# Patient Record
Sex: MALE | Race: WHITE | NOT HISPANIC OR LATINO | URBAN - METROPOLITAN AREA
[De-identification: names, ages, dates, MRNs, and addresses within clinical notes are randomized per-mention and may not be internally consistent; named-entity substitution may affect disease eponyms.]

---

## 2023-03-07 DIAGNOSIS — F41.8 ANXIETY ASSOCIATED WITH DEPRESSION: ICD-10-CM

## 2023-03-07 RX ORDER — ESCITALOPRAM OXALATE 20 MG/1
10 TABLET ORAL DAILY
COMMUNITY
End: 2023-03-07 | Stop reason: SDUPTHER

## 2023-03-07 RX ORDER — ESCITALOPRAM OXALATE 20 MG/1
20 TABLET ORAL DAILY
Qty: 30 TABLET | Refills: 0 | Status: SHIPPED | OUTPATIENT
Start: 2023-03-07 | End: 2023-04-05

## 2023-03-31 DIAGNOSIS — F41.8 ANXIETY ASSOCIATED WITH DEPRESSION: ICD-10-CM

## 2023-04-05 RX ORDER — ESCITALOPRAM OXALATE 20 MG/1
TABLET ORAL
Qty: 30 TABLET | Refills: 0 | Status: SHIPPED | OUTPATIENT
Start: 2023-04-05 | End: 2023-05-03

## 2023-05-02 DIAGNOSIS — F41.8 ANXIETY ASSOCIATED WITH DEPRESSION: ICD-10-CM

## 2023-05-03 RX ORDER — ESCITALOPRAM OXALATE 20 MG/1
TABLET ORAL
Qty: 30 TABLET | Refills: 0 | Status: SHIPPED | OUTPATIENT
Start: 2023-05-03 | End: 2023-06-02

## 2023-06-01 DIAGNOSIS — F41.8 ANXIETY ASSOCIATED WITH DEPRESSION: ICD-10-CM

## 2023-06-02 RX ORDER — ESCITALOPRAM OXALATE 20 MG/1
TABLET ORAL
Qty: 30 TABLET | Refills: 0 | Status: SHIPPED | OUTPATIENT
Start: 2023-06-02 | End: 2023-07-11 | Stop reason: SDUPTHER

## 2023-06-27 DIAGNOSIS — F41.8 ANXIETY ASSOCIATED WITH DEPRESSION: ICD-10-CM

## 2023-06-29 RX ORDER — ESCITALOPRAM OXALATE 20 MG/1
TABLET ORAL
Qty: 30 TABLET | Refills: 0 | OUTPATIENT
Start: 2023-06-29

## 2023-07-11 RX ORDER — ESCITALOPRAM OXALATE 20 MG/1
20 TABLET ORAL DAILY
Qty: 30 TABLET | Refills: 0 | Status: SHIPPED | OUTPATIENT
Start: 2023-07-11 | End: 2023-08-01 | Stop reason: SDUPTHER

## 2023-08-01 ENCOUNTER — OFFICE VISIT (OUTPATIENT)
Dept: PRIMARY CARE | Facility: CLINIC | Age: 43
End: 2023-08-01
Payer: COMMERCIAL

## 2023-08-01 VITALS
HEIGHT: 74 IN | BODY MASS INDEX: 25.95 KG/M2 | WEIGHT: 202.2 LBS | DIASTOLIC BLOOD PRESSURE: 70 MMHG | HEART RATE: 60 BPM | SYSTOLIC BLOOD PRESSURE: 120 MMHG

## 2023-08-01 DIAGNOSIS — Z00.00 HEALTHCARE MAINTENANCE: Primary | ICD-10-CM

## 2023-08-01 DIAGNOSIS — E78.2 MIXED HYPERLIPIDEMIA: ICD-10-CM

## 2023-08-01 DIAGNOSIS — F32.A ANXIETY AND DEPRESSION: ICD-10-CM

## 2023-08-01 DIAGNOSIS — F41.9 ANXIETY AND DEPRESSION: ICD-10-CM

## 2023-08-01 DIAGNOSIS — F41.8 ANXIETY ASSOCIATED WITH DEPRESSION: ICD-10-CM

## 2023-08-01 PROBLEM — E78.5 HLD (HYPERLIPIDEMIA): Status: ACTIVE | Noted: 2023-08-01

## 2023-08-01 LAB
POC APPEARANCE, URINE: CLEAR
POC BILIRUBIN, URINE: NEGATIVE
POC BLOOD, URINE: NEGATIVE
POC COLOR, URINE: YELLOW
POC GLUCOSE, URINE: NEGATIVE MG/DL
POC KETONES, URINE: NEGATIVE MG/DL
POC LEUKOCYTES, URINE: NEGATIVE
POC NITRITE,URINE: NEGATIVE
POC PH, URINE: 6.5 PH
POC PROTEIN, URINE: NEGATIVE MG/DL
POC SPECIFIC GRAVITY, URINE: 1.01
POC UROBILINOGEN, URINE: 0.2 EU/DL

## 2023-08-01 PROCEDURE — 81002 URINALYSIS NONAUTO W/O SCOPE: CPT | Performed by: STUDENT IN AN ORGANIZED HEALTH CARE EDUCATION/TRAINING PROGRAM

## 2023-08-01 PROCEDURE — 1036F TOBACCO NON-USER: CPT | Performed by: STUDENT IN AN ORGANIZED HEALTH CARE EDUCATION/TRAINING PROGRAM

## 2023-08-01 PROCEDURE — 99396 PREV VISIT EST AGE 40-64: CPT | Performed by: STUDENT IN AN ORGANIZED HEALTH CARE EDUCATION/TRAINING PROGRAM

## 2023-08-01 PROCEDURE — 99213 OFFICE O/P EST LOW 20 MIN: CPT | Performed by: STUDENT IN AN ORGANIZED HEALTH CARE EDUCATION/TRAINING PROGRAM

## 2023-08-01 RX ORDER — ESCITALOPRAM OXALATE 20 MG/1
20 TABLET ORAL DAILY
Qty: 90 TABLET | Refills: 1 | Status: SHIPPED | OUTPATIENT
Start: 2023-08-01 | End: 2024-02-08

## 2023-08-01 NOTE — PROGRESS NOTES
"Subjective   Patient ID: Bebeto Garcia is a 43 y.o. male who presents for Annual Exam.  Today he is accompanied by alone.     HPI    1. Health maintenance  Overall he feels well without any major issues/complaints  Immunizations: UTD including Tdap (8/1/2022) COVID-19 x3  Colon cancer screening: No Fhx colon cancer or any other types of cancer.   Diet: Balanced during school year, minor lapses in diet during summertime.   Exercise: Active at his job as a  and \"odd jobs around the house\"  Tobacco: None but does use recreational marijuana   EtOH: Socially (2 drinks per week).   Denies any chest pain, palpitations, shortness of breath, cough, nausea, vomiting, diarrhea, or any other acute signs/symptoms     2. Anxiety/Depression  Well controlled, taking Lexapro 20 mg daily just now over 1 year ago.   Requesting refills     3. Hyperlipidemia   Last lipid panel (8/1/2022) with cholesterol 241, , HDL 59.8, and TG 83.   Pt not taking any statin or other cholesterol medication.   Fhx of Types II diabetes with his father and grandfather.  This contributing to both having multiple heart attacks and father having a CABG    Current Outpatient Medications on File Prior to Visit   Medication Sig Dispense Refill    [DISCONTINUED] escitalopram (Lexapro) 20 mg tablet Take 1 tablet (20 mg) by mouth once daily. 30 tablet 0     No current facility-administered medications on file prior to visit.        No Known Allergies    Immunization History   Administered Date(s) Administered    MMR vaccine, subcutaneous (MMR II) 07/01/1993    Moderna SARS-CoV-2 Vaccination 02/24/2021, 03/22/2021    Pfizer Purple Cap SARS-CoV-2 11/22/2021    Td (adult), unspecified 11/14/1996    Tdap vaccine, age 10 years and older (BOOSTRIX) 08/01/2022         Review of Systems  All pertinent positive symptoms are included in the history of present illness.  All other systems have been reviewed and are negative and noncontributory " "to this patient's current ailments.     Objective   /70 (BP Location: Left arm, Patient Position: Sitting, BP Cuff Size: Adult)   Pulse 60   Ht 1.88 m (6' 2\")   Wt 91.7 kg (202 lb 3.2 oz)   BMI 25.96 kg/m²   BSA: 2.19 meters squared  No visits with results within 1 Month(s) from this visit.   Latest known visit with results is:   [facility] Encounter on 08/01/2022   Component Date Value Ref Range Status    Hepatitis C Ab 08/01/2022 NONREACTIVE  NONREACTIVE Final    Comment:  Results from patients taking biotin supplements or receiving   high-dose biotin therapy should be interpreted with caution   due to possible interference with this test. Providers may    contact their local laboratory for further information.      TSH 08/01/2022 2.08  0.44 - 3.98 mIU/L Final    Comment:  TSH testing is performed using different testing    methodology at [facility] than at other    [facility]. Direct result comparisons should    only be made within the same method.      WBC 08/01/2022 3.7 (L)  4.4 - 11.3 x10E9/L Final    RBC 08/01/2022 5.02  4.50 - 5.90 x10E12/L Final    Hemoglobin 08/01/2022 16.0  13.5 - 17.5 g/dL Final    Hematocrit 08/01/2022 45.4  41.0 - 52.0 % Final    MCV 08/01/2022 90  80 - 100 fL Final    MCHC 08/01/2022 35.2  32.0 - 36.0 g/dL Final    Platelets 08/01/2022 162  150 - 450 x10E9/L Final    RDW 08/01/2022 12.2  11.5 - 14.5 % Final    Neutrophils % 08/01/2022 53.7  40.0 - 80.0 % Final    Immature Granulocytes %, Automated 08/01/2022 0.3  0.0 - 0.9 % Final    Comment:  Immature Granulocyte Count (IG) includes promyelocytes,    myelocytes and metamyelocytes but does not include bands.   Percent differential counts (%) should be interpreted in the   context of the absolute cell counts (cells/L).      Lymphocytes % 08/01/2022 30.7  13.0 - 44.0 % Final    Monocytes % 08/01/2022 10.7  2.0 - 10.0 % Final    Eosinophils % 08/01/2022 3.8  0.0 - 6.0 % Final    Basophils % 08/01/2022 0.8  0.0 - " 2.0 % Final    Neutrophils Absolute 08/01/2022 1.96  1.20 - 7.70 x10E9/L Final    Lymphocytes Absolute 08/01/2022 1.12 (L)  1.20 - 4.80 x10E9/L Final    Monocytes Absolute 08/01/2022 0.39  0.10 - 1.00 x10E9/L Final    Eosinophils Absolute 08/01/2022 0.14  0.00 - 0.70 x10E9/L Final    Basophils Absolute 08/01/2022 0.03  0.00 - 0.10 x10E9/L Final    Cholesterol 08/01/2022 241 (H)  0 - 199 mg/dL Final    Comment: .      AGE      DESIRABLE   BORDERLINE HIGH   HIGH     0-19 Y     0 - 169       170 - 199     >/= 200    20-24 Y     0 - 189       190 - 224     >/= 225         >24 Y     0 - 199       200 - 239     >/= 240   **All ranges are based on fasting samples. Specific   therapeutic targets will vary based on patient-specific   cardiac risk.  .   Pediatric guidelines reference:Pediatrics 2011, 128(S5).   Adult guidelines reference: NCEP ATPIII Guidelines,     CASSIE 2001, 258:2486-97  .   Venipuncture immediately after or during the    administration of Metamizole may lead to falsely   low results. Testing should be performed immediately   prior to Metamizole dosing.      HDL 08/01/2022 59.8  mg/dL Final    Comment: .      AGE      VERY LOW   LOW     NORMAL    HIGH       0-19 Y       < 35   < 40     40-45     ----    20-24 Y       ----   < 40       >45     ----      >24 Y       ----   < 40     40-60      >60  .      Cholesterol/HDL Ratio 08/01/2022 4.0   Final    Comment: REF VALUES  DESIRABLE  < 3.4  HIGH RISK  > 5.0      LDL 08/01/2022 165 (H)  0 - 99 mg/dL Final    Comment: .                           NEAR      BORD      AGE      DESIRABLE  OPTIMAL    HIGH     HIGH     VERY HIGH     0-19 Y     0 - 109     ---    110-129   >/= 130     ----    20-24 Y     0 - 119     ---    120-159   >/= 160     ----      >24 Y     0 -  99   100-129  130-159   160-189     >/=190  .      VLDL 08/01/2022 17  0 - 40 mg/dL Final    Triglycerides 08/01/2022 83  0 - 149 mg/dL Final    Comment: .      AGE      DESIRABLE   BORDERLINE HIGH    HIGH     VERY HIGH   0 D-90 D    19 - 174         ----         ----        ----  91 D- 9 Y     0 -  74        75 -  99     >/= 100      ----    10-19 Y     0 -  89        90 - 129     >/= 130      ----    20-24 Y     0 - 114       115 - 149     >/= 150      ----         >24 Y     0 - 149       150 - 199    200- 499    >/= 500  .   Venipuncture immediately after or during the    administration of Metamizole may lead to falsely   low results. Testing should be performed immediately   prior to Metamizole dosing.      PSA 08/01/2022 0.47  0.00 - 4.00 ng/mL Final    Comment: The FDA requires that the method used for PSA assay be   reported to the physician. Values obtained with different   assay methods must not be used interchangeably. This test  was performed at [facility] using the Access   Hybritech PSA assay is a two-site immunoenzymatic sandwich   assay. The assay is approved for measurement of   prostate-specific antigen (PSA)in serum and may be used   in conjunction with a digital rectal examination in men   50 years and older as an aid in detection of prostate   cancer.  5-Alpha-reductase inhibitors (e.g. Proscar, Finasteride,   Avodart, Dutasteride and Erika) for the treatment of BPH   have been shown to lower PSA levels by an average of 50%   after 6 months of treatment.      Glucose 08/01/2022 97  74 - 99 mg/dL Final    Sodium 08/01/2022 141  136 - 145 mmol/L Final    Potassium 08/01/2022 4.2  3.5 - 5.3 mmol/L Final    Chloride 08/01/2022 104  98 - 107 mmol/L Final    Bicarbonate 08/01/2022 31  21 - 32 mmol/L Final    Anion Gap 08/01/2022 10  10 - 20 mmol/L Final    Urea Nitrogen 08/01/2022 12  6 - 23 mg/dL Final    Creatinine 08/01/2022 0.97  0.50 - 1.30 mg/dL Final    GFR MALE 08/01/2022 >90  >90 mL/min/1.73m2 Final    Comment:  CALCULATIONS OF ESTIMATED GFR ARE PERFORMED   USING THE 2021 CKD-EPI STUDY REFIT EQUATION   WITHOUT THE RACE VARIABLE FOR THE IDMS-TRACEABLE   CREATININE  METHODS.    https://jasn.asnjournals.org/content/early/2021/09/22/ASN.9186229888      Calcium 08/01/2022 9.8  8.6 - 10.3 mg/dL Final    Albumin 08/01/2022 4.4  3.4 - 5.0 g/dL Final    Alkaline Phosphatase 08/01/2022 51  33 - 120 U/L Final    Total Protein 08/01/2022 6.9  6.4 - 8.2 g/dL Final    AST 08/01/2022 17  9 - 39 U/L Final    Total Bilirubin 08/01/2022 0.7  0.0 - 1.2 mg/dL Final    ALT (SGPT) 08/01/2022 13  10 - 52 U/L Final    Comment:  Patients treated with Sulfasalazine may generate    falsely decreased results for ALT.         Physical Exam  CONSTITUTIONAL - well nourished, well developed, looks like stated age, in no acute distress, not ill-appearing, and not tired appearing  SKIN - normal skin color and pigmentation, normal skin turgor without rash, lesions, or nodules visualized  HEAD - no trauma, normocephalic  EYES - extraocular muscles are intact, and normal external exam  ENT - TM's intact, no injection, no signs of infection, uvula midline, normal tongue movement and throat normal, no exudate  NECK - supple without rigidity, no neck mass was observed, no thyromegaly or thyroid nodules  CHEST - clear to auscultation, no wheezing, no crackles and no rales, good effort  CARDIAC - regular rate and regular rhythm, no skipped beats, no murmur  ABDOMEN - no organomegaly, soft, nontender, nondistended, normal bowel sounds, no guarding/rebound/rigidity, negative McBurney sign and negative Hernandez sign  EXTREMITIES - no edema, no deformities  NEUROLOGICAL - normal gait, normal balance, normal motor, no ataxia, patellar DTRs equal and symmetrical; alert, oriented and no focal signs  PSYCHIATRIC - alert, pleasant and cordial, age-appropriate  IMMUNOLOGIC - no cervical lymphadenopathy     Assessment/Plan   1. Health maintenance  Complete history and physical examination was performed  EKG was not performed at today's visit  Requisition for CBC, CMP, TSH, lipid, A1c has been provided to you  We will notify of  test results once available and make treatment recommendations accordingly     2. Anxiety/Depression  Well controlled, refills of Lexapro 20 mg once daily sent to your pharmacy.     3. Hyperlipidemia   Reviewed last lipid panel (8/1/2022) as stated above.   Ordered repeat lipid panel and CT cardiac score for continued monitoring.   No statin therapy started at this time.   Will notify of these results and any accommodations accordingly.     Please follow-up in 6 months for continued care as well as refills in regards to the Lexapro

## 2024-02-08 DIAGNOSIS — F32.A ANXIETY AND DEPRESSION: ICD-10-CM

## 2024-02-08 DIAGNOSIS — F41.9 ANXIETY AND DEPRESSION: ICD-10-CM

## 2024-02-08 RX ORDER — ESCITALOPRAM OXALATE 20 MG/1
20 TABLET ORAL DAILY
Qty: 30 TABLET | Refills: 0 | Status: SHIPPED | OUTPATIENT
Start: 2024-02-08 | End: 2024-03-11

## 2024-02-26 ENCOUNTER — OFFICE VISIT (OUTPATIENT)
Dept: PRIMARY CARE | Facility: CLINIC | Age: 44
End: 2024-02-26
Payer: COMMERCIAL

## 2024-02-26 VITALS
BODY MASS INDEX: 25.81 KG/M2 | HEART RATE: 129 BPM | DIASTOLIC BLOOD PRESSURE: 80 MMHG | SYSTOLIC BLOOD PRESSURE: 114 MMHG | WEIGHT: 201 LBS

## 2024-02-26 DIAGNOSIS — H92.01 RIGHT EAR PAIN: ICD-10-CM

## 2024-02-26 DIAGNOSIS — J06.9 ACUTE URI: Primary | ICD-10-CM

## 2024-02-26 DIAGNOSIS — R21 RASH: ICD-10-CM

## 2024-02-26 PROCEDURE — 99214 OFFICE O/P EST MOD 30 MIN: CPT | Performed by: STUDENT IN AN ORGANIZED HEALTH CARE EDUCATION/TRAINING PROGRAM

## 2024-02-26 PROCEDURE — 1036F TOBACCO NON-USER: CPT | Performed by: STUDENT IN AN ORGANIZED HEALTH CARE EDUCATION/TRAINING PROGRAM

## 2024-02-26 RX ORDER — METHYLPREDNISOLONE 4 MG/1
TABLET ORAL
Qty: 21 TABLET | Refills: 0 | Status: SHIPPED | OUTPATIENT
Start: 2024-02-26 | End: 2024-03-04

## 2024-02-26 RX ORDER — FLUTICASONE PROPIONATE 50 MCG
1 SPRAY, SUSPENSION (ML) NASAL 2 TIMES DAILY
COMMUNITY
Start: 2024-02-22 | End: 2024-03-21

## 2024-02-26 RX ORDER — DOXYCYCLINE 100 MG/1
100 CAPSULE ORAL 2 TIMES DAILY
Qty: 20 CAPSULE | Refills: 0 | Status: SHIPPED | OUTPATIENT
Start: 2024-02-26 | End: 2024-03-07

## 2024-02-26 ASSESSMENT — PATIENT HEALTH QUESTIONNAIRE - PHQ9
1. LITTLE INTEREST OR PLEASURE IN DOING THINGS: NOT AT ALL
SUM OF ALL RESPONSES TO PHQ9 QUESTIONS 1 AND 2: 0
2. FEELING DOWN, DEPRESSED OR HOPELESS: NOT AT ALL

## 2024-02-26 NOTE — PROGRESS NOTES
Subjective   Patient ID: Bebeto Garcia is a 44 y.o. male who presents for URI.    HPI   Acute Upper Respiratory Infection/Ear Infection  Patient has been having nausea, headaches, body aches, fevers, ear aches, painful throat, and congested sinuses that started 6 days ago   [facility] and was tested negative for strep, flu, and COVID-19 and was recommended supportive care   3 days ago, symptoms worsened as patient endorses sever throat pain exacerbated by swallowing   Visited urgent care and was diagnosed with ear infection, started on amoxicillin with no relief of symptoms   Developed a rash on his right ear lobe, continues to have ear pain, sinus congestion, dry cough, painful throat   Not allergic to any medications, has had amoxicillin in the past without rash  Has tried mucinex and flonase with no improvements in congestion   Has tried Pseudophed PE OTC medication with up to 2 hrs of symptom relief after each use   Denies shortness of breath, chest pain, diarrhea, constipation, lightheadedness       Review of Systems  All pertinent positive symptoms are included in the history of present illness.  All other systems have been reviewed and are negative and noncontributory to this patient's current ailments.      Objective   /80 (BP Location: Left arm, Patient Position: Sitting, BP Cuff Size: Adult)   Pulse (!) 129   Wt 91.2 kg (201 lb)   BMI 25.81 kg/m²     Physical Exam  CONSTITUTIONAL - well nourished, well developed, looks like stated age, in no acute distress, not ill-appearing, and not tired appearing  SKIN - normal skin color and pigmentation, normal skin turgor without rash, lesions, or nodules visualized  ENT - Erythematous, no pharyngeal exudates, no swelling/edema, vesiculopapular rash on the right ear lobe   HEAD - no trauma, normocephalic  EYES - normal external exam  CHEST -no distressed breathing, good effort  EXTREMITIES - no edema, no deformities  NEUROLOGICAL  - normal balance, normal motor, no ataxia  PSYCHIATRIC - alert, pleasant and cordial, age-appropriate      Assessment/Plan   Acute URI/Ear Infection   Symptoms and symptom duration can likely be explained by viral/bacterial etiology  Start on methylprednisolone to address sinus congestion   Start on Doxycycline, please take medication as prescribed      Risks, benefits, and options of treatment(s) were discussed after reviewing all current medication(s) and drug allergy(ies)  I opted for the treatment that we discussed with instructions on the medication use for your underlying medical ailment(s)  I encouraged supportive care such as rest, fluids and Tylenol as warranted  Return to the clinic in 3-5 days or sooner if symptoms worsen or persist as we will then further evaluate    If at any point symptoms worsen or you have trouble breathing or severe temperature dysregulation/fevers, please contact me immediately and/or make your way to the nearest emergency room for acute evaluation     In regards to the rash on your earlobe, this could even be the start of shingles but I am not fully sure  Please continue monitoring this closely as well

## 2024-03-08 DIAGNOSIS — F32.A ANXIETY AND DEPRESSION: ICD-10-CM

## 2024-03-08 DIAGNOSIS — F41.9 ANXIETY AND DEPRESSION: ICD-10-CM

## 2024-03-11 RX ORDER — ESCITALOPRAM OXALATE 20 MG/1
20 TABLET ORAL DAILY
Qty: 30 TABLET | Refills: 0 | Status: SHIPPED | OUTPATIENT
Start: 2024-03-11 | End: 2024-04-17 | Stop reason: SDUPTHER

## 2024-04-09 DIAGNOSIS — F41.9 ANXIETY AND DEPRESSION: ICD-10-CM

## 2024-04-09 DIAGNOSIS — F32.A ANXIETY AND DEPRESSION: ICD-10-CM

## 2024-04-09 RX ORDER — ESCITALOPRAM OXALATE 20 MG/1
20 TABLET ORAL DAILY
Qty: 30 TABLET | Refills: 0 | OUTPATIENT
Start: 2024-04-09

## 2024-04-17 RX ORDER — ESCITALOPRAM OXALATE 20 MG/1
20 TABLET ORAL DAILY
Qty: 60 TABLET | Refills: 0 | Status: SHIPPED | OUTPATIENT
Start: 2024-04-17 | End: 2024-06-04 | Stop reason: SDUPTHER

## 2024-06-04 ENCOUNTER — OFFICE VISIT (OUTPATIENT)
Dept: PRIMARY CARE | Facility: CLINIC | Age: 44
End: 2024-06-04
Payer: COMMERCIAL

## 2024-06-04 ENCOUNTER — LAB (OUTPATIENT)
Dept: LAB | Facility: LAB | Age: 44
End: 2024-06-04
Payer: COMMERCIAL

## 2024-06-04 VITALS
SYSTOLIC BLOOD PRESSURE: 106 MMHG | WEIGHT: 202 LBS | DIASTOLIC BLOOD PRESSURE: 64 MMHG | BODY MASS INDEX: 25.94 KG/M2 | OXYGEN SATURATION: 96 % | HEART RATE: 85 BPM

## 2024-06-04 DIAGNOSIS — Z00.00 HEALTHCARE MAINTENANCE: ICD-10-CM

## 2024-06-04 DIAGNOSIS — F32.A ANXIETY AND DEPRESSION: ICD-10-CM

## 2024-06-04 DIAGNOSIS — E78.2 MIXED HYPERLIPIDEMIA: Primary | ICD-10-CM

## 2024-06-04 DIAGNOSIS — F41.8 ANXIETY ASSOCIATED WITH DEPRESSION: ICD-10-CM

## 2024-06-04 DIAGNOSIS — F41.9 ANXIETY AND DEPRESSION: ICD-10-CM

## 2024-06-04 LAB
ALBUMIN SERPL BCP-MCNC: 4.1 G/DL (ref 3.4–5)
ALP SERPL-CCNC: 59 U/L (ref 33–120)
ALT SERPL W P-5'-P-CCNC: 11 U/L (ref 10–52)
ANION GAP SERPL CALC-SCNC: 11 MMOL/L (ref 10–20)
AST SERPL W P-5'-P-CCNC: 16 U/L (ref 9–39)
BASOPHILS # BLD AUTO: 0.03 X10*3/UL (ref 0–0.1)
BASOPHILS NFR BLD AUTO: 0.6 %
BILIRUB SERPL-MCNC: 0.6 MG/DL (ref 0–1.2)
BUN SERPL-MCNC: 14 MG/DL (ref 6–23)
CALCIUM SERPL-MCNC: 9.3 MG/DL (ref 8.6–10.3)
CHLORIDE SERPL-SCNC: 103 MMOL/L (ref 98–107)
CHOLEST SERPL-MCNC: 248 MG/DL (ref 0–199)
CHOLESTEROL/HDL RATIO: 3.6
CO2 SERPL-SCNC: 29 MMOL/L (ref 21–32)
CREAT SERPL-MCNC: 0.87 MG/DL (ref 0.5–1.3)
EGFRCR SERPLBLD CKD-EPI 2021: >90 ML/MIN/1.73M*2
EOSINOPHIL # BLD AUTO: 0.23 X10*3/UL (ref 0–0.7)
EOSINOPHIL NFR BLD AUTO: 4.7 %
ERYTHROCYTE [DISTWIDTH] IN BLOOD BY AUTOMATED COUNT: 12.7 % (ref 11.5–14.5)
GLUCOSE SERPL-MCNC: 83 MG/DL (ref 74–99)
HCT VFR BLD AUTO: 50.1 % (ref 41–52)
HDLC SERPL-MCNC: 69.1 MG/DL
HGB BLD-MCNC: 17.1 G/DL (ref 13.5–17.5)
IMM GRANULOCYTES # BLD AUTO: 0.02 X10*3/UL (ref 0–0.7)
IMM GRANULOCYTES NFR BLD AUTO: 0.4 % (ref 0–0.9)
LDLC SERPL CALC-MCNC: 155 MG/DL
LYMPHOCYTES # BLD AUTO: 1.59 X10*3/UL (ref 1.2–4.8)
LYMPHOCYTES NFR BLD AUTO: 32.3 %
MCH RBC QN AUTO: 31.5 PG (ref 26–34)
MCHC RBC AUTO-ENTMCNC: 34.1 G/DL (ref 32–36)
MCV RBC AUTO: 92 FL (ref 80–100)
MONOCYTES # BLD AUTO: 0.42 X10*3/UL (ref 0.1–1)
MONOCYTES NFR BLD AUTO: 8.5 %
NEUTROPHILS # BLD AUTO: 2.64 X10*3/UL (ref 1.2–7.7)
NEUTROPHILS NFR BLD AUTO: 53.5 %
NON HDL CHOLESTEROL: 179 MG/DL (ref 0–149)
NRBC BLD-RTO: 0 /100 WBCS (ref 0–0)
PLATELET # BLD AUTO: 201 X10*3/UL (ref 150–450)
POTASSIUM SERPL-SCNC: 4.5 MMOL/L (ref 3.5–5.3)
PROT SERPL-MCNC: 6.6 G/DL (ref 6.4–8.2)
PSA SERPL-MCNC: 0.54 NG/ML
RBC # BLD AUTO: 5.42 X10*6/UL (ref 4.5–5.9)
SODIUM SERPL-SCNC: 138 MMOL/L (ref 136–145)
TRIGL SERPL-MCNC: 122 MG/DL (ref 0–149)
TSH SERPL-ACNC: 3.28 MIU/L (ref 0.44–3.98)
VLDL: 24 MG/DL (ref 0–40)
WBC # BLD AUTO: 4.9 X10*3/UL (ref 4.4–11.3)

## 2024-06-04 PROCEDURE — 80053 COMPREHEN METABOLIC PANEL: CPT

## 2024-06-04 PROCEDURE — 1036F TOBACCO NON-USER: CPT | Performed by: STUDENT IN AN ORGANIZED HEALTH CARE EDUCATION/TRAINING PROGRAM

## 2024-06-04 PROCEDURE — 36415 COLL VENOUS BLD VENIPUNCTURE: CPT

## 2024-06-04 PROCEDURE — 84443 ASSAY THYROID STIM HORMONE: CPT

## 2024-06-04 PROCEDURE — 83036 HEMOGLOBIN GLYCOSYLATED A1C: CPT

## 2024-06-04 PROCEDURE — 84153 ASSAY OF PSA TOTAL: CPT

## 2024-06-04 PROCEDURE — 99214 OFFICE O/P EST MOD 30 MIN: CPT | Performed by: STUDENT IN AN ORGANIZED HEALTH CARE EDUCATION/TRAINING PROGRAM

## 2024-06-04 PROCEDURE — 80061 LIPID PANEL: CPT

## 2024-06-04 PROCEDURE — 85025 COMPLETE CBC W/AUTO DIFF WBC: CPT

## 2024-06-04 RX ORDER — ESCITALOPRAM OXALATE 20 MG/1
20 TABLET ORAL DAILY
Qty: 90 TABLET | Refills: 1 | Status: SHIPPED | OUTPATIENT
Start: 2024-06-04

## 2024-06-04 ASSESSMENT — PATIENT HEALTH QUESTIONNAIRE - PHQ9
2. FEELING DOWN, DEPRESSED OR HOPELESS: NOT AT ALL
1. LITTLE INTEREST OR PLEASURE IN DOING THINGS: NOT AT ALL
SUM OF ALL RESPONSES TO PHQ9 QUESTIONS 1 AND 2: 0

## 2024-06-04 NOTE — PROGRESS NOTES
Subjective   Patient ID: Bebeto Garcia is a 44 y.o. male who presents for Depression, Anxiety, and Hyperlipidemia.  Today he is accompanied by alone.     HPI    1. Anxiety/Depression  Well controlled, taking Lexapro 20 mg daily  Denies any HI/SI and feels very well  Requesting refills to his local pharmacy    2. Hyperlipidemia   Last lipid panel (8/1/2022) with cholesterol 241, , HDL 59.8, and TG 83.   Currently not taking any medication for his hyperlipidemia  ASCVD risk score is below 5%  Does have a history of type 2 diabetes within his father and grandfather  Both contributing to multiple heart attacks in his father having a CABG  Feels well with any issues/complaints    Current Outpatient Medications on File Prior to Visit   Medication Sig Dispense Refill    fluticasone (Flonase) 50 mcg/actuation nasal spray Administer 1 spray into affected nostril(s) twice a day.      [DISCONTINUED] escitalopram (Lexapro) 20 mg tablet Take 1 tablet (20 mg) by mouth once daily. 60 tablet 0     No current facility-administered medications on file prior to visit.        No Known Allergies    Immunization History   Administered Date(s) Administered    MMR vaccine, subcutaneous (MMR II) 07/01/1993    Moderna SARS-CoV-2 Vaccination 02/24/2021, 03/22/2021    Pfizer Purple Cap SARS-CoV-2 11/22/2021    Td (adult), unspecified 11/14/1996    Tdap vaccine, age 7 year and older (BOOSTRIX, ADACEL) 08/01/2022         Review of Systems  All pertinent positive symptoms are included in the history of present illness.  All other systems have been reviewed and are negative and noncontributory to this patient's current ailments.     Objective   /64 (BP Location: Left arm, Patient Position: Sitting, BP Cuff Size: Adult)   Pulse 85   Wt 91.6 kg (202 lb)   SpO2 96%   BMI 25.94 kg/m²   BSA: 2.19 meters squared      Physical Exam  CONSTITUTIONAL - well nourished, well developed, looks like stated age, in no acute distress, not  ill-appearing, and not tired appearing  SKIN - normal skin color and pigmentation, normal skin turgor without rash, lesions, or nodules visualized  HEAD - no trauma, normocephalic  EYES - normal external exam  CHEST -no distressed breathing, good effort  EXTREMITIES - no edema, no deformities  NEUROLOGICAL - normal balance, normal motor, no ataxia  PSYCHIATRIC - alert, pleasant and cordial, age-appropriate    Assessment/Plan   1.  Anxiety/Depression  Stable.  No changes recommend at this time  Will continue Lexapro 20 mg daily as indicated the chart  Refill sent to your pharmacy    2. Hyperlipidemia   Reviewed last lipid panel (8/1/2022) and also this was noted at your physical examination last year  At your physical last year, we ordered repeat lipid panel and CT cardiac score for continued monitoring but unfortunate was not completed  No statin therapy started at this time.   Please get this blood work at your earliest major convenience    Please follow-up in 3-6 months for your physical examination and continued refills    Please get all this done over the summertime as you are off from school

## 2024-06-05 LAB
EST. AVERAGE GLUCOSE BLD GHB EST-MCNC: 103 MG/DL
HBA1C MFR BLD: 5.2 %

## 2024-06-05 NOTE — RESULT ENCOUNTER NOTE
Cholesterol is still slightly elevated 248, HDL 69, , triglycerides 122    Thyroid within normal limits    Prostate within normal limits    Complete blood cell count shows no anemia    Sugar, kidneys, liver, electrolytes are all within normal limits    We are just waiting for the hemoglobin A1c at this time

## 2024-12-08 DIAGNOSIS — F41.9 ANXIETY AND DEPRESSION: ICD-10-CM

## 2024-12-08 DIAGNOSIS — F32.A ANXIETY AND DEPRESSION: ICD-10-CM

## 2024-12-09 RX ORDER — ESCITALOPRAM OXALATE 20 MG/1
20 TABLET ORAL DAILY
Qty: 30 TABLET | Refills: 0 | Status: SHIPPED | OUTPATIENT
Start: 2024-12-09

## 2025-01-08 DIAGNOSIS — F41.9 ANXIETY AND DEPRESSION: ICD-10-CM

## 2025-01-08 DIAGNOSIS — F32.A ANXIETY AND DEPRESSION: ICD-10-CM

## 2025-01-08 RX ORDER — ESCITALOPRAM OXALATE 20 MG/1
20 TABLET ORAL DAILY
Qty: 30 TABLET | Refills: 0 | Status: SHIPPED | OUTPATIENT
Start: 2025-01-08

## 2025-01-24 ENCOUNTER — APPOINTMENT (OUTPATIENT)
Dept: PRIMARY CARE | Facility: CLINIC | Age: 45
End: 2025-01-24
Payer: COMMERCIAL

## 2025-01-31 ENCOUNTER — APPOINTMENT (OUTPATIENT)
Dept: PRIMARY CARE | Facility: CLINIC | Age: 45
End: 2025-01-31
Payer: COMMERCIAL

## 2025-01-31 VITALS
DIASTOLIC BLOOD PRESSURE: 66 MMHG | HEIGHT: 75 IN | BODY MASS INDEX: 26.49 KG/M2 | HEART RATE: 83 BPM | SYSTOLIC BLOOD PRESSURE: 114 MMHG | OXYGEN SATURATION: 97 % | WEIGHT: 213 LBS

## 2025-01-31 DIAGNOSIS — Z00.00 HEALTHCARE MAINTENANCE: Primary | ICD-10-CM

## 2025-01-31 DIAGNOSIS — F41.9 ANXIETY AND DEPRESSION: ICD-10-CM

## 2025-01-31 DIAGNOSIS — Z23 NEED FOR VACCINATION FOR H FLU TYPE B: ICD-10-CM

## 2025-01-31 DIAGNOSIS — F32.A ANXIETY AND DEPRESSION: ICD-10-CM

## 2025-01-31 DIAGNOSIS — Z23 FLU VACCINE NEED: ICD-10-CM

## 2025-01-31 DIAGNOSIS — E78.5 DYSLIPIDEMIA: ICD-10-CM

## 2025-01-31 RX ORDER — ESCITALOPRAM OXALATE 20 MG/1
20 TABLET ORAL DAILY
Qty: 90 TABLET | Refills: 1 | Status: SHIPPED | OUTPATIENT
Start: 2025-01-31 | End: 2025-07-30

## 2025-01-31 ASSESSMENT — PROMIS GLOBAL HEALTH SCALE
RATE_GENERAL_HEALTH: VERY GOOD
RATE_QUALITY_OF_LIFE: EXCELLENT
EMOTIONAL_PROBLEMS: OFTEN
CARRYOUT_PHYSICAL_ACTIVITIES: COMPLETELY
RATE_PHYSICAL_HEALTH: VERY GOOD
RATE_AVERAGE_PAIN: 2
CARRYOUT_SOCIAL_ACTIVITIES: VERY GOOD
RATE_AVERAGE_FATIGUE: MILD
RATE_MENTAL_HEALTH: GOOD
RATE_SOCIAL_SATISFACTION: VERY GOOD

## 2025-01-31 ASSESSMENT — PATIENT HEALTH QUESTIONNAIRE - PHQ9
1. LITTLE INTEREST OR PLEASURE IN DOING THINGS: NOT AT ALL
2. FEELING DOWN, DEPRESSED OR HOPELESS: NOT AT ALL
SUM OF ALL RESPONSES TO PHQ9 QUESTIONS 1 AND 2: 0

## 2025-01-31 NOTE — PROGRESS NOTES
Subjective   Patient ID: Bebeto Garcia is a 45 y.o. male who presents for Annual Exam.  Today he is accompanied by alone.     HPI    1. Health maintenance  Immunizations: UTD including Tdap (8/1/2022) COVID-19 x3, patient is due for flu  Colon cancer screening: No Fhx colon cancer or any other types of cancer.   Diet: no great  Exercise: active job  Tobacco: None but does use recreational marijuana   EtOH: Socially (2 drinks per week).   Denies any chest pain, palpitations, shortness of breath, cough, nausea, vomiting, diarrhea, or any other acute signs/symptoms     2. Anxiety/Depression  Well controlled, taking Lexapro 20 mg daily just now over 3 year ago.   Requesting refills   Reports no side effects    3. Hyperlipidemia   Last lipid panel (6/2024) with cholesterol 248, , HDL 69.1, and .    Pt not taking any statin or other cholesterol medication.   Fhx of Types II diabetes with his father and grandfather.  This contributing to both having multiple heart attacks and father having a CABG.       Current Outpatient Medications on File Prior to Visit   Medication Sig Dispense Refill    [DISCONTINUED] escitalopram (Lexapro) 20 mg tablet TAKE 1 TABLET BY MOUTH EVERY DAY 30 tablet 0    [DISCONTINUED] fluticasone (Flonase) 50 mcg/actuation nasal spray Administer 1 spray into affected nostril(s) twice a day.       No current facility-administered medications on file prior to visit.        No Known Allergies    Immunization History   Administered Date(s) Administered    COVID-19, mRNA, LNP-S, PF, 30 mcg/0.3 mL dose 11/22/2021    Flu vaccine, trivalent, preservative free, age 6 months and greater (Fluarix/Fluzone/Flulaval) 01/31/2025    MMR vaccine, subcutaneous (MMR II) 07/01/1993    Moderna SARS-CoV-2 Vaccination 02/24/2021, 03/22/2021    Td (adult), unspecified 11/14/1996    Tdap vaccine, age 7 year and older (BOOSTRIX, ADACEL) 08/01/2022         Review of Systems  All pertinent positive symptoms are included  "in the history of present illness.  All other systems have been reviewed and are negative and noncontributory to this patient's current ailments.     Objective   /66 (BP Location: Left arm, Patient Position: Sitting, BP Cuff Size: Large adult)   Pulse 83   Ht 1.905 m (6' 3\")   Wt 96.6 kg (213 lb)   SpO2 97%   BMI 26.62 kg/m²   BSA: 2.26 meters squared  No visits with results within 1 Month(s) from this visit.   Latest known visit with results is:   Lab on 06/04/2024   Component Date Value Ref Range Status    Prostate Specific AG 06/04/2024 0.54  <=4.00 ng/mL Final    Thyroid Stimulating Hormone 06/04/2024 3.28  0.44 - 3.98 mIU/L Final    Cholesterol 06/04/2024 248 (H)  0 - 199 mg/dL Final          Age      Desirable   Borderline High   High     0-19 Y     0 - 169       170 - 199     >/= 200    20-24 Y     0 - 189       190 - 224     >/= 225         >24 Y     0 - 199       200 - 239     >/= 240   **All ranges are based on fasting samples. Specific   therapeutic targets will vary based on patient-specific   cardiac risk.    Pediatric guidelines reference:Pediatrics 2011, 128(S5).Adult guidelines reference: NCEP ATPIII Guidelines,CASSIE 2001, 258:2486-97    Venipuncture immediately after or during the administration of Metamizole may lead to falsely low results. Testing should be performed immediately prior to Metamizole dosing.    HDL-Cholesterol 06/04/2024 69.1  mg/dL Final      Age       Very Low   Low     Normal    High    0-19 Y    < 35      < 40     40-45     ----  20-24 Y    ----     < 40      >45      ----        >24 Y      ----     < 40     40-60      >60      Cholesterol/HDL Ratio 06/04/2024 3.6   Final      Ref Values  Desirable  < 3.4  High Risk  > 5.0    LDL Calculated 06/04/2024 155 (H)  <=99 mg/dL Final                                Near   Borderline      AGE      Desirable  Optimal    High     High     Very High     0-19 Y     0 - 109     ---    110-129   >/= 130     ----    20-24 Y     0 - " 119     ---    120-159   >/= 160     ----      >24 Y     0 -  99   100-129  130-159   160-189     >/=190      VLDL 06/04/2024 24  0 - 40 mg/dL Final    Triglycerides 06/04/2024 122  0 - 149 mg/dL Final       Age         Desirable   Borderline High   High     Very High   0 D-90 D    19 - 174         ----         ----        ----  91 D- 9 Y     0 -  74        75 -  99     >/= 100      ----    10-19 Y     0 -  89        90 - 129     >/= 130      ----    20-24 Y     0 - 114       115 - 149     >/= 150      ----         >24 Y     0 - 149       150 - 199    200- 499    >/= 500    Venipuncture immediately after or during the administration of Metamizole may lead to falsely low results. Testing should be performed immediately prior to Metamizole dosing.    Non HDL Cholesterol 06/04/2024 179 (H)  0 - 149 mg/dL Final          Age       Desirable   Borderline High   High     Very High     0-19 Y     0 - 119       120 - 144     >/= 145    >/= 160    20-24 Y     0 - 149       150 - 189     >/= 190      ----         >24 Y    30 mg/dL above LDL Cholesterol goal      Glucose 06/04/2024 83  74 - 99 mg/dL Final    Sodium 06/04/2024 138  136 - 145 mmol/L Final    Potassium 06/04/2024 4.5  3.5 - 5.3 mmol/L Final    Chloride 06/04/2024 103  98 - 107 mmol/L Final    Bicarbonate 06/04/2024 29  21 - 32 mmol/L Final    Anion Gap 06/04/2024 11  10 - 20 mmol/L Final    Urea Nitrogen 06/04/2024 14  6 - 23 mg/dL Final    Creatinine 06/04/2024 0.87  0.50 - 1.30 mg/dL Final    eGFR 06/04/2024 >90  >60 mL/min/1.73m*2 Final    Calculations of estimated GFR are performed using the 2021 CKD-EPI Study Refit equation without the race variable for the IDMS-Traceable creatinine methods.  https://jasn.asnjournals.org/content/early/2021/09/22/ASN.2385614624    Calcium 06/04/2024 9.3  8.6 - 10.3 mg/dL Final    Albumin 06/04/2024 4.1  3.4 - 5.0 g/dL Final    Alkaline Phosphatase 06/04/2024 59  33 - 120 U/L Final    Total Protein 06/04/2024 6.6  6.4 - 8.2  g/dL Final    AST 06/04/2024 16  9 - 39 U/L Final    Bilirubin, Total 06/04/2024 0.6  0.0 - 1.2 mg/dL Final    ALT 06/04/2024 11  10 - 52 U/L Final    Patients treated with Sulfasalazine may generate falsely decreased results for ALT.    WBC 06/04/2024 4.9  4.4 - 11.3 x10*3/uL Final    nRBC 06/04/2024 0.0  0.0 - 0.0 /100 WBCs Final    RBC 06/04/2024 5.42  4.50 - 5.90 x10*6/uL Final    Hemoglobin 06/04/2024 17.1  13.5 - 17.5 g/dL Final    Hematocrit 06/04/2024 50.1  41.0 - 52.0 % Final    MCV 06/04/2024 92  80 - 100 fL Final    MCH 06/04/2024 31.5  26.0 - 34.0 pg Final    MCHC 06/04/2024 34.1  32.0 - 36.0 g/dL Final    RDW 06/04/2024 12.7  11.5 - 14.5 % Final    Platelets 06/04/2024 201  150 - 450 x10*3/uL Final    Neutrophils % 06/04/2024 53.5  40.0 - 80.0 % Final    Immature Granulocytes %, Automated 06/04/2024 0.4  0.0 - 0.9 % Final    Immature Granulocyte Count (IG) includes promyelocytes, myelocytes and metamyelocytes but does not include bands. Percent differential counts (%) should be interpreted in the context of the absolute cell counts (cells/UL).    Lymphocytes % 06/04/2024 32.3  13.0 - 44.0 % Final    Monocytes % 06/04/2024 8.5  2.0 - 10.0 % Final    Eosinophils % 06/04/2024 4.7  0.0 - 6.0 % Final    Basophils % 06/04/2024 0.6  0.0 - 2.0 % Final    Neutrophils Absolute 06/04/2024 2.64  1.20 - 7.70 x10*3/uL Final    Percent differential counts (%) should be interpreted in the context of the absolute cell counts (cells/uL).    Immature Granulocytes Absolute, Au* 06/04/2024 0.02  0.00 - 0.70 x10*3/uL Final    Lymphocytes Absolute 06/04/2024 1.59  1.20 - 4.80 x10*3/uL Final    Monocytes Absolute 06/04/2024 0.42  0.10 - 1.00 x10*3/uL Final    Eosinophils Absolute 06/04/2024 0.23  0.00 - 0.70 x10*3/uL Final    Basophils Absolute 06/04/2024 0.03  0.00 - 0.10 x10*3/uL Final    Hemoglobin A1C 06/04/2024 5.2  see below % Final    Estimated Average Glucose 06/04/2024 103  Not Established mg/dL Final       Physical  Exam  CONSTITUTIONAL - well nourished, well developed, looks like stated age, in no acute distress, not ill-appearing, and not tired appearing  SKIN - normal skin color and pigmentation, normal skin turgor without rash, lesions, or nodules visualized  HEAD - no trauma, normocephalic  ENT - TM's intact, no injection, no signs of infection, uvula midline, normal tongue movement and throat normal, no exudate  NECK - supple without rigidity, no neck mass was observed, no thyromegaly or thyroid nodules  CHEST - clear to auscultation, no wheezing, no crackles and no rales, good effort  CARDIAC - regular rate and regular rhythm, no skipped beats, no murmur  ABDOMEN - no organomegaly, soft, nontender, nondistended, normal bowel sounds, no guarding/rebound/rigidityEXTREMITIES - no edema, no deformities  NEUROLOGICAL - normal gait, normal balance, normal motor, no ataxia  PSYCHIATRIC - alert, pleasant and cordial, age-appropriate  IMMUNOLOGIC - no cervical lymphadenopathy     Assessment/Plan   1. Health maintenance  Complete history and physical examination was performed  EKG was performed at today's visit and was within normal limits  He had flu vaccine today.   Requisition for CBC, CMP, TSH, lipid, A1c has been provided to you  We will notify of test results once available and make treatment recommendations accordingly     2. Anxiety/Depression  Well controlled, refills of Lexapro 20 mg once daily sent to your pharmacy.     3. Hyperlipidemia   Ordered repeat lipid panel and CT cardiac score for continued monitoring.   No statin therapy started at this time.  Patient would like to work on diet and exercise   The 10-year ASCVD risk score (Angie DK, et al., 2019) is: 1.4%    Values used to calculate the score:      Age: 45 years      Sex: Male      Is Non- : No      Diabetic: No      Tobacco smoker: No      Systolic Blood Pressure: 114 mmHg      Is BP treated: No      HDL Cholesterol: 69.1 mg/dL       Total Cholesterol: 248 mg/dL   Will notify of these results and any accommodations accordingly.     Please follow-up in 6 months for continue care.    I discussed my plan with my attending, Dr. Krystian Middleton. MD  Family medicine resident  PGY3

## 2025-03-21 ENCOUNTER — APPOINTMENT (OUTPATIENT)
Dept: UROLOGY | Facility: CLINIC | Age: 45
End: 2025-03-21
Payer: COMMERCIAL

## 2025-06-29 ENCOUNTER — OFFICE VISIT (OUTPATIENT)
Dept: URGENT CARE | Age: 45
End: 2025-06-29
Payer: COMMERCIAL

## 2025-06-29 VITALS
BODY MASS INDEX: 26.75 KG/M2 | OXYGEN SATURATION: 97 % | SYSTOLIC BLOOD PRESSURE: 127 MMHG | WEIGHT: 214 LBS | DIASTOLIC BLOOD PRESSURE: 74 MMHG | HEART RATE: 70 BPM

## 2025-06-29 DIAGNOSIS — S81.819A LACERATION OF SHIN: Primary | ICD-10-CM

## 2025-06-29 NOTE — PROGRESS NOTES
"Subjective   Patient ID: Francesco Garcia \"Nicole" is a 45 y.o. male. They present today with a chief complaint of laceration on left shin.  Happened around 1:30 PM today while using the lawnmower.  Last Tdap was in 2022      Past Medical History  Allergies as of 06/29/2025    (No Known Allergies)       Prescriptions Prior to Admission[1]     Medical History[2]    Surgical History[3]     reports that he has quit smoking. His smoking use included cigarettes. He has never used smokeless tobacco.    Review of Systems  Review of Systems                               Objective    Vitals:    06/29/25 1711   BP: 127/74   Pulse: 70   SpO2: 97%   Weight: 97.1 kg (214 lb)     No LMP for male patient.    Physical Exam  Vitals reviewed.   Constitutional:       General: He is not in acute distress.  HENT:      Head: Normocephalic and atraumatic.      Nose: Nose normal.      Mouth/Throat:      Mouth: Mucous membranes are moist.   Eyes:      Extraocular Movements: Extraocular movements intact.      Conjunctiva/sclera: Conjunctivae normal.   Pulmonary:      Effort: Pulmonary effort is normal.   Skin:     General: Skin is warm.      Findings: Laceration present.             Comments: About 2 cm x 10 mm.  Flap-like skin.  No signs of a foreign body.  No signs of infection.   Neurological:      Mental Status: He is alert and oriented to person, place, and time.   Psychiatric:         Mood and Affect: Mood normal.         Behavior: Behavior normal.         Laceration Repair    Date/Time: 6/29/2025 5:56 PM    Performed by: Miroslava Mccracken PA-C  Authorized by: Miroslava Mccracken PA-C    Consent:     Consent obtained:  Verbal    Consent given by:  Patient    Risks discussed:  Infection, pain, need for additional repair, poor cosmetic result, retained foreign body, tendon damage, vascular damage, poor wound healing and nerve damage    Alternatives discussed:  No treatment  Universal protocol:     Patient identity confirmed:  Verbally with " patient  Anesthesia:     Anesthesia method:  None  Laceration details:     Location:  Leg    Leg location:  L lower leg    Length (cm):  2    Depth (mm):  10  Pre-procedure details:     Preparation:  Patient was prepped and draped in usual sterile fashion  Treatment:     Area cleansed with:  Chlorhexidine    Amount of cleaning:  Standard  Skin repair:     Repair method:  Steri-Strips    Number of Steri-Strips:  10  Approximation:     Approximation:  Close  Repair type:     Repair type:  Simple  Post-procedure details:     Procedure completion:  Tolerated well, no immediate complications      Point of Care Test & Imaging Results from this visit  No results found for this visit on 06/29/25.   Imaging  No results found.    Cardiology, Vascular, and Other Imaging  No other imaging results found for the past 2 days      Diagnostic study results (if any) were reviewed by Miroslava Mccracken PA-C.    Assessment/Plan   Allergies, medications, history, and pertinent labs/EKGs/Imaging reviewed by Miroslava Mccracken PA-C.     Medical Decision Making  Laceration repair- see procedure note.  -         Patient is educated about their diagnoses.     -          Discussed medications benefits and adverse effects.     -          Answered all patient’s questions.     -          Patient will call 911 or go to the nearest ED if worsen symptoms .     -          Patient is agreeable to the plan of care and is deemed stable upon discharge.     -          Follow up with your primary care provider in two days.      Orders and Diagnoses  Diagnoses and all orders for this visit:  Laceration of shin  Other orders  -     Laceration Repair      Medical Admin Record      Patient disposition: Home    Electronically signed by Miroslava Mccracken PA-C  5:58 PM           [1] (Not in a hospital admission)   [2] History reviewed. No pertinent past medical history.  [3]   Past Surgical History:  Procedure Laterality Date    OTHER SURGICAL HISTORY  08/01/2022    No  history of surgery

## 2025-08-15 DIAGNOSIS — F41.9 ANXIETY AND DEPRESSION: ICD-10-CM

## 2025-08-15 DIAGNOSIS — F32.A ANXIETY AND DEPRESSION: ICD-10-CM

## 2025-08-15 RX ORDER — ESCITALOPRAM OXALATE 20 MG/1
20 TABLET ORAL DAILY
Qty: 90 TABLET | Refills: 1 | OUTPATIENT
Start: 2025-08-15

## 2025-09-03 DIAGNOSIS — F32.A ANXIETY AND DEPRESSION: ICD-10-CM

## 2025-09-03 DIAGNOSIS — F41.9 ANXIETY AND DEPRESSION: ICD-10-CM

## 2025-09-03 RX ORDER — ESCITALOPRAM OXALATE 20 MG/1
20 TABLET ORAL DAILY
Qty: 30 TABLET | Refills: 0 | Status: SHIPPED | OUTPATIENT
Start: 2025-09-03 | End: 2025-10-03

## 2025-09-03 RX ORDER — ESCITALOPRAM OXALATE 20 MG/1
20 TABLET ORAL DAILY
Qty: 90 TABLET | Refills: 1 | OUTPATIENT
Start: 2025-09-03

## 2025-09-11 ENCOUNTER — APPOINTMENT (OUTPATIENT)
Dept: PRIMARY CARE | Facility: CLINIC | Age: 45
End: 2025-09-11
Payer: COMMERCIAL

## 2025-09-15 ENCOUNTER — APPOINTMENT (OUTPATIENT)
Dept: PRIMARY CARE | Facility: CLINIC | Age: 45
End: 2025-09-15

## 2025-09-18 ENCOUNTER — APPOINTMENT (OUTPATIENT)
Dept: UROLOGY | Facility: CLINIC | Age: 45
End: 2025-09-18